# Patient Record
Sex: FEMALE | Race: WHITE | HISPANIC OR LATINO
[De-identification: names, ages, dates, MRNs, and addresses within clinical notes are randomized per-mention and may not be internally consistent; named-entity substitution may affect disease eponyms.]

---

## 2017-09-25 PROBLEM — Z00.00 ENCOUNTER FOR PREVENTIVE HEALTH EXAMINATION: Status: ACTIVE | Noted: 2017-09-25

## 2017-10-19 ENCOUNTER — APPOINTMENT (OUTPATIENT)
Dept: SURGERY | Facility: CLINIC | Age: 42
End: 2017-10-19
Payer: COMMERCIAL

## 2017-10-19 VITALS
DIASTOLIC BLOOD PRESSURE: 98 MMHG | HEART RATE: 86 BPM | HEIGHT: 63 IN | SYSTOLIC BLOOD PRESSURE: 146 MMHG | WEIGHT: 195 LBS | BODY MASS INDEX: 34.55 KG/M2

## 2017-10-19 DIAGNOSIS — Z87.891 PERSONAL HISTORY OF NICOTINE DEPENDENCE: ICD-10-CM

## 2017-10-19 DIAGNOSIS — Z80.9 FAMILY HISTORY OF MALIGNANT NEOPLASM, UNSPECIFIED: ICD-10-CM

## 2017-10-19 DIAGNOSIS — M06.9 RHEUMATOID ARTHRITIS, UNSPECIFIED: ICD-10-CM

## 2017-10-19 PROCEDURE — 31575 DIAGNOSTIC LARYNGOSCOPY: CPT

## 2017-10-19 PROCEDURE — 99204 OFFICE O/P NEW MOD 45 MIN: CPT | Mod: 25

## 2017-10-27 ENCOUNTER — RESULT REVIEW (OUTPATIENT)
Age: 42
End: 2017-10-27

## 2017-12-19 ENCOUNTER — FORM ENCOUNTER (OUTPATIENT)
Age: 42
End: 2017-12-19

## 2017-12-20 ENCOUNTER — OUTPATIENT (OUTPATIENT)
Dept: OUTPATIENT SERVICES | Facility: HOSPITAL | Age: 42
LOS: 1 days | End: 2017-12-20
Payer: COMMERCIAL

## 2017-12-20 VITALS
WEIGHT: 197.09 LBS | HEIGHT: 63.5 IN | HEART RATE: 92 BPM | RESPIRATION RATE: 14 BRPM | DIASTOLIC BLOOD PRESSURE: 80 MMHG | TEMPERATURE: 97 F | SYSTOLIC BLOOD PRESSURE: 128 MMHG

## 2017-12-20 DIAGNOSIS — E04.2 NONTOXIC MULTINODULAR GOITER: ICD-10-CM

## 2017-12-20 LAB
BUN SERPL-MCNC: 10 MG/DL — SIGNIFICANT CHANGE UP (ref 7–23)
CALCIUM SERPL-MCNC: 8.8 MG/DL — SIGNIFICANT CHANGE UP (ref 8.4–10.5)
CHLORIDE SERPL-SCNC: 105 MMOL/L — SIGNIFICANT CHANGE UP (ref 98–107)
CO2 SERPL-SCNC: 25 MMOL/L — SIGNIFICANT CHANGE UP (ref 22–31)
CREAT SERPL-MCNC: 0.68 MG/DL — SIGNIFICANT CHANGE UP (ref 0.5–1.3)
GLUCOSE SERPL-MCNC: 85 MG/DL — SIGNIFICANT CHANGE UP (ref 70–99)
HCT VFR BLD CALC: 39.9 % — SIGNIFICANT CHANGE UP (ref 34.5–45)
HGB BLD-MCNC: 13.3 G/DL — SIGNIFICANT CHANGE UP (ref 11.5–15.5)
MCHC RBC-ENTMCNC: 29.4 PG — SIGNIFICANT CHANGE UP (ref 27–34)
MCHC RBC-ENTMCNC: 33.3 % — SIGNIFICANT CHANGE UP (ref 32–36)
MCV RBC AUTO: 88.1 FL — SIGNIFICANT CHANGE UP (ref 80–100)
NRBC # FLD: 0 — SIGNIFICANT CHANGE UP
PLATELET # BLD AUTO: 322 K/UL — SIGNIFICANT CHANGE UP (ref 150–400)
PMV BLD: 10.7 FL — SIGNIFICANT CHANGE UP (ref 7–13)
POTASSIUM SERPL-MCNC: 4.1 MMOL/L — SIGNIFICANT CHANGE UP (ref 3.5–5.3)
POTASSIUM SERPL-SCNC: 4.1 MMOL/L — SIGNIFICANT CHANGE UP (ref 3.5–5.3)
RBC # BLD: 4.53 M/UL — SIGNIFICANT CHANGE UP (ref 3.8–5.2)
RBC # FLD: 13.3 % — SIGNIFICANT CHANGE UP (ref 10.3–14.5)
SODIUM SERPL-SCNC: 143 MMOL/L — SIGNIFICANT CHANGE UP (ref 135–145)
WBC # BLD: 5.54 K/UL — SIGNIFICANT CHANGE UP (ref 3.8–10.5)
WBC # FLD AUTO: 5.54 K/UL — SIGNIFICANT CHANGE UP (ref 3.8–10.5)

## 2017-12-20 PROCEDURE — 93010 ELECTROCARDIOGRAM REPORT: CPT

## 2017-12-20 PROCEDURE — 71020: CPT | Mod: 26

## 2017-12-20 NOTE — H&P PST ADULT - RS GEN PE MLT RESP DETAILS PC
no intercostal retractions/no rales/breath sounds equal/clear to auscultation bilaterally/good air movement/respirations non-labored/no rhonchi/no chest wall tenderness/no wheezes

## 2017-12-20 NOTE — H&P PST ADULT - NEGATIVE GENERAL GENITOURINARY SYMPTOMS
no flank pain L/no hematuria/no flank pain R/no bladder infections/no dysuria/normal urinary frequency

## 2017-12-20 NOTE — H&P PST ADULT - RESPIRATORY
detailed exam

## 2017-12-20 NOTE — H&P PST ADULT - HISTORY OF PRESENT ILLNESS
43y/o female scheduled for total thyroidectomy on 12/27/2017.  Pt states, "has goiter for the past 3 yrs, over the past year has grown in size, bx benign.  Mother has hx of thyroid cancer."

## 2017-12-20 NOTE — H&P PST ADULT - PROBLEM SELECTOR PLAN 1
Pt scheduled for total thyroidectomy on 12/27/2017.  labs done results pending, ekg with PVC pt scheduled for medical clearance.  Discussed with surgical coordinator Aleja.  Urine cup provided.  Preop teaching done, pt able to verbalize understanding.

## 2017-12-20 NOTE — H&P PST ADULT - NEGATIVE GENERAL SYMPTOMS
no polyuria/no polydipsia/no fever/no malaise/no fatigue/no weight loss/no chills/no sweating/no anorexia/no weight gain/no polyphagia

## 2017-12-20 NOTE — H&P PST ADULT - GASTROINTESTINAL DETAILS
soft/no organomegaly/bowel sounds normal/no bruit/no rebound tenderness/no guarding/no distention/nontender/no rigidity/no masses palpable

## 2017-12-20 NOTE — H&P PST ADULT - NEGATIVE CARDIOVASCULAR SYMPTOMS
no claudication/no orthopnea/no paroxysmal nocturnal dyspnea/no dyspnea on exertion/no palpitations/no chest pain/no peripheral edema

## 2017-12-27 ENCOUNTER — TRANSCRIPTION ENCOUNTER (OUTPATIENT)
Age: 42
End: 2017-12-27

## 2017-12-27 ENCOUNTER — APPOINTMENT (OUTPATIENT)
Dept: SURGERY | Facility: HOSPITAL | Age: 42
End: 2017-12-27

## 2017-12-27 ENCOUNTER — INPATIENT (INPATIENT)
Facility: HOSPITAL | Age: 42
LOS: 0 days | Discharge: HOME | End: 2017-12-28
Attending: SPECIALIST | Admitting: SPECIALIST
Payer: COMMERCIAL

## 2017-12-27 ENCOUNTER — RESULT REVIEW (OUTPATIENT)
Age: 42
End: 2017-12-27

## 2017-12-27 ENCOUNTER — OTHER (OUTPATIENT)
Age: 42
End: 2017-12-27

## 2017-12-27 VITALS
SYSTOLIC BLOOD PRESSURE: 140 MMHG | OXYGEN SATURATION: 98 % | WEIGHT: 197.09 LBS | TEMPERATURE: 98 F | HEIGHT: 63.5 IN | HEART RATE: 80 BPM | RESPIRATION RATE: 16 BRPM | DIASTOLIC BLOOD PRESSURE: 79 MMHG

## 2017-12-27 DIAGNOSIS — E04.2 NONTOXIC MULTINODULAR GOITER: ICD-10-CM

## 2017-12-27 LAB
CALCIUM SERPL-MCNC: 8.5 MG/DL — SIGNIFICANT CHANGE UP (ref 8.4–10.5)
HCG UR QL: NEGATIVE — SIGNIFICANT CHANGE UP

## 2017-12-27 PROCEDURE — 60240 REMOVAL OF THYROID: CPT

## 2017-12-27 PROCEDURE — 60240 REMOVAL OF THYROID: CPT | Mod: AS

## 2017-12-27 PROCEDURE — 88307 TISSUE EXAM BY PATHOLOGIST: CPT | Mod: 26

## 2017-12-27 PROCEDURE — 13132 CMPLX RPR F/C/C/M/N/AX/G/H/F: CPT | Mod: 59

## 2017-12-27 RX ORDER — LEVOTHYROXINE SODIUM 125 MCG
1 TABLET ORAL
Qty: 0 | Refills: 0 | DISCHARGE
Start: 2017-12-27

## 2017-12-27 RX ORDER — LEVOTHYROXINE SODIUM 125 MCG
1 TABLET ORAL
Qty: 30 | Refills: 2 | OUTPATIENT
Start: 2017-12-27 | End: 2018-03-26

## 2017-12-27 RX ORDER — ACETAMINOPHEN 500 MG
650 TABLET ORAL EVERY 6 HOURS
Qty: 0 | Refills: 0 | Status: DISCONTINUED | OUTPATIENT
Start: 2017-12-27 | End: 2017-12-28

## 2017-12-27 RX ORDER — CALCIUM CARBONATE 500(1250)
1 TABLET ORAL
Qty: 0 | Refills: 0 | Status: DISCONTINUED | OUTPATIENT
Start: 2017-12-27 | End: 2017-12-28

## 2017-12-27 RX ORDER — OMEGA-3 ACID ETHYL ESTERS 1 G
1 CAPSULE ORAL
Qty: 0 | Refills: 0 | COMMUNITY

## 2017-12-27 RX ORDER — CALCIUM GLUCONATE 100 MG/ML
1 VIAL (ML) INTRAVENOUS ONCE
Qty: 0 | Refills: 0 | Status: COMPLETED | OUTPATIENT
Start: 2017-12-27 | End: 2017-12-27

## 2017-12-27 RX ORDER — OXYCODONE AND ACETAMINOPHEN 5; 325 MG/1; MG/1
1 TABLET ORAL EVERY 4 HOURS
Qty: 0 | Refills: 0 | Status: DISCONTINUED | OUTPATIENT
Start: 2017-12-27 | End: 2017-12-28

## 2017-12-27 RX ORDER — ACETAMINOPHEN 500 MG
2 TABLET ORAL
Qty: 0 | Refills: 0 | DISCHARGE
Start: 2017-12-27

## 2017-12-27 RX ORDER — CALCIUM CARBONATE 500(1250)
1 TABLET ORAL
Qty: 0 | Refills: 0 | COMMUNITY
Start: 2017-12-27

## 2017-12-27 RX ORDER — SODIUM CHLORIDE 9 MG/ML
1000 INJECTION, SOLUTION INTRAVENOUS
Qty: 0 | Refills: 0 | Status: DISCONTINUED | OUTPATIENT
Start: 2017-12-27 | End: 2017-12-28

## 2017-12-27 RX ORDER — IBUPROFEN 200 MG
2 TABLET ORAL
Qty: 0 | Refills: 0 | COMMUNITY

## 2017-12-27 RX ORDER — LEVOTHYROXINE SODIUM 125 MCG
100 TABLET ORAL DAILY
Qty: 0 | Refills: 0 | Status: DISCONTINUED | OUTPATIENT
Start: 2017-12-27 | End: 2017-12-28

## 2017-12-27 RX ADMIN — SODIUM CHLORIDE 50 MILLILITER(S): 9 INJECTION, SOLUTION INTRAVENOUS at 18:50

## 2017-12-27 RX ADMIN — Medication 650 MILLIGRAM(S): at 21:21

## 2017-12-27 RX ADMIN — Medication 650 MILLIGRAM(S): at 22:20

## 2017-12-27 RX ADMIN — Medication 1 TABLET(S): at 23:37

## 2017-12-27 RX ADMIN — Medication 1 TABLET(S): at 19:13

## 2017-12-27 RX ADMIN — Medication 200 GRAM(S): at 18:50

## 2017-12-27 NOTE — DISCHARGE NOTE ADULT - INSTRUCTIONS
Regular diet Please NOTIFY MD for any of the following s/s: S/S infection (Fever >100.4, chills, increased redness, increased bleeding, pus-like drainage from incision line), uncontrolled pain not relieved by pain medications, persistent nausea/vomiting or inability to tolerate diet. No heavy lifting; No driving if/while taking narcotic pain medications. Please drink 6-8 glasses of water daily to stay hydrated.

## 2017-12-27 NOTE — DISCHARGE NOTE ADULT - CONDITIONS AT DISCHARGE
Pt A&Ox4. Vs stable. Pain well controlled. Anterior neck surgical incision clean and dry and free from s/s infection with steri-strips LUPE. Pt OOB, ambulating in room and on unit, tolerating PO diet, and voiding adequately.

## 2017-12-27 NOTE — DISCHARGE NOTE ADULT - MEDICATION SUMMARY - MEDICATIONS TO TAKE
I will START or STAY ON the medications listed below when I get home from the hospital:    acetaminophen 325 mg oral tablet  -- 2 tab(s) by mouth every 6 hours, As needed, Moderate Pain (4 - 6)  -- Indication: For Non-toxic multinodular goiter    calcium carbonate 500 mg (200 mg elemental calcium) oral tablet, chewable  -- 1 tab(s) by mouth 4 times a day  -- Indication: For Non-toxic multinodular goiter    levothyroxine 100 mcg (0.1 mg) oral tablet  -- 1 tab(s) by mouth once a day  -- Indication: For Non-toxic multinodular goiter I will START or STAY ON the medications listed below when I get home from the hospital:    acetaminophen 325 mg oral tablet  -- 2 tab(s) by mouth every 6 hours, As needed, Moderate Pain (4 - 6)  -- Indication: For Non-toxic multinodular goiter    calcium carbonate 500 mg (200 mg elemental calcium) oral tablet, chewable  -- 1 tab(s) by mouth 4 times a day  -- Indication: For Non-toxic multinodular goiter    levothyroxine 100 mcg (0.1 mg) oral tablet  -- 1 tab(s) by mouth once a day  -- Indication: For Non-toxic multinodular goiter    Synthroid 100 mcg (0.1 mg) oral tablet  -- 1 tab(s) by mouth once a day   -- It is very important that you take or use this exactly as directed.  Do not skip doses or discontinue unless directed by your doctor.  Medication should be taken with plenty of water.  Some non-prescription drugs may aggravate your condition.  Read all labels carefully.  If a warning appears, check with your doctor before taking.  Take medication on an empty stomach 1 hour before or 2 to 3 hours after a meal unless otherwise directed by your doctor.    -- Indication: For Multinodular goiter (nontoxic) I will START or STAY ON the medications listed below when I get home from the hospital:    acetaminophen 325 mg oral tablet  -- 2 tab(s) by mouth every 6 hours, As needed, Moderate Pain (4 - 6)  -- Indication: For Non-toxic multinodular goiter    calcium carbonate 500 mg (200 mg elemental calcium) oral tablet, chewable  -- 2 tab(s) by mouth every 6 hours  -- Indication: For Calcium     levothyroxine 100 mcg (0.1 mg) oral tablet  -- 1 tab(s) by mouth once a day  -- Indication: For Non-toxic multinodular goiter

## 2017-12-27 NOTE — DISCHARGE NOTE ADULT - PLAN OF CARE
Please allow steri-strips to fall off on their own.  Ok to shower and rinse wound with warm soapy water tomorrow.  Do not scrub wound, pat dry. Please call the doctor immediately if you develop fever, chills, inability to tolerate liquid or food, difficulty swallowing or speaking, numbness/ tingling and/or fatigue/ lethargy. Do not drive or operate machinery while taking narcotic pain medication such as percocet or oxycodone. Please follow up with your surgeon -  and your primary care doctor within 1 week of discharge. Pain medication

## 2017-12-27 NOTE — DISCHARGE NOTE ADULT - CARE PLAN
Principal Discharge DX:	Multinodular goiter (nontoxic) Principal Discharge DX:	Multinodular goiter (nontoxic)  Goal:	Pain medication  Instructions for follow-up, activity and diet:	Please allow steri-strips to fall off on their own.  Ok to shower and rinse wound with warm soapy water tomorrow.  Do not scrub wound, pat dry. Please call the doctor immediately if you develop fever, chills, inability to tolerate liquid or food, difficulty swallowing or speaking, numbness/ tingling and/or fatigue/ lethargy. Do not drive or operate machinery while taking narcotic pain medication such as percocet or oxycodone. Please follow up with your surgeon -  and your primary care doctor within 1 week of discharge.

## 2017-12-27 NOTE — BRIEF OPERATIVE NOTE - PROCEDURE
<<-----Click on this checkbox to enter Procedure Total thyroidectomy  12/27/2017    Active  KTORTORELL

## 2017-12-27 NOTE — PROGRESS NOTE ADULT - SUBJECTIVE AND OBJECTIVE BOX
[facility] GENERAL SURGERY POST-OP NOTE    SUBJECTIVE: Pt underwent total thyroidectomy. Pt tolerated procedure well and transferred to PACU then floor. Pt seen and evaluated at bedside. Resting comfortably in bed. Pain controlled. Denies nausea/vomiting, CP, palpitations, SOB, lightheaded, dizziness. Voiding.     Objective:  Gen: NAD, AAOx3  HEENT: neck flat without evidence of underlying hematoma, incision c/d/i, TEVIN with minimal sanguinous output  Pulm: b/l chest rise. No work on breathing  Card: RRR  Abd: soft, NT, ND      Vital Signs Last 24 Hrs  T(C): 36.6 (27 Dec 2017 21:07), Max: 36.7 (27 Dec 2017 13:51)  T(F): 97.8 (27 Dec 2017 21:07), Max: 98.1 (27 Dec 2017 13:51)  HR: 78 (27 Dec 2017 21:07) (78 - 96)  BP: 130/80 (27 Dec 2017 21:07) (106/65 - 140/79)  BP(mean): --  RR: 17 (27 Dec 2017 21:07) (15 - 21)  SpO2: 96% (27 Dec 2017 21:07) (95% - 99%)  I&O's Summary    27 Dec 2017 07:01  -  27 Dec 2017 23:13  --------------------------------------------------------  IN: 270 mL / OUT: 400 mL / NET: -130 mL      I&O's Detail    27 Dec 2017 07:01  -  27 Dec 2017 23:13  --------------------------------------------------------  IN:    lactated ringers.: 150 mL    Oral Fluid: 120 mL  Total IN: 270 mL    OUT:    Voided: 400 mL  Total OUT: 400 mL    Total NET: -130 mL          MEDICATIONS  (STANDING):  calcium carbonate 500 mG (Tums) Chewable 1 Tablet(s) Chew four times a day  lactated ringers. 1000 milliLiter(s) (50 mL/Hr) IV Continuous <Continuous>  levothyroxine 100 MICROGram(s) Oral daily    MEDICATIONS  (PRN):  acetaminophen   Tablet. 650 milliGRAM(s) Oral every 6 hours PRN Moderate Pain (4 - 6)  oxyCODONE    5 mG/acetaminophen 325 mG 1 Tablet(s) Oral every 4 hours PRN Severe Pain (7 - 10)      LABS:      Ca    8.5      27 Dec 2017 20:06            RADIOLOGY & ADDITIONAL STUDIES:      A/P: 42y Female s/p total thyroidectomy. Pt hemodynamically stable.      - Pain control  - Strict I/O's  - F/u any evidence of neck swelling, voice hoarseness, or dyspnea  - OOB/DVT ppx  - AM labs  - f/u TEVIN output

## 2017-12-27 NOTE — DISCHARGE NOTE ADULT - MEDICATION SUMMARY - MEDICATIONS TO STOP TAKING
I will STOP taking the medications listed below when I get home from the hospital:    Fish Oil oral capsule  -- 1 cap(s) by mouth once a day in am. Last dose 11/30/17    Multiple Vitamins oral tablet  -- 1 tab(s) by mouth once a day in am. Last dose 11/30/17    Advil 200 mg oral tablet  -- 2 tab(s) by mouth , As Needed. Last dose 11/30/17

## 2017-12-27 NOTE — DISCHARGE NOTE ADULT - CARE PROVIDER_API CALL
Yash Blue), Plastic Surgery; Surgery  [address] [place] [address]  Phone: [phone]  Fax: [phone]

## 2017-12-27 NOTE — DISCHARGE NOTE ADULT - PATIENT PORTAL LINK FT
“You can access the Follow[employer] Patient Portal, offered by [facility], by registering with the following website: [email or web address]”

## 2017-12-28 VITALS — HEART RATE: 92 BPM

## 2017-12-28 LAB — CALCIUM SERPL-MCNC: 8.2 MG/DL — LOW (ref 8.4–10.5)

## 2017-12-28 RX ORDER — CALCIUM CARBONATE 500(1250)
2 TABLET ORAL EVERY 6 HOURS
Qty: 0 | Refills: 0 | Status: DISCONTINUED | OUTPATIENT
Start: 2017-12-28 | End: 2017-12-28

## 2017-12-28 RX ORDER — CALCIUM CARBONATE 500(1250)
2 TABLET ORAL
Qty: 0 | Refills: 0 | DISCHARGE
Start: 2017-12-28

## 2017-12-28 RX ADMIN — Medication 100 MICROGRAM(S): at 05:05

## 2017-12-28 RX ADMIN — Medication 650 MILLIGRAM(S): at 08:57

## 2017-12-28 RX ADMIN — Medication 1 TABLET(S): at 05:05

## 2017-12-28 RX ADMIN — Medication 650 MILLIGRAM(S): at 08:27

## 2017-12-28 RX ADMIN — Medication 2 TABLET(S): at 11:13

## 2017-12-28 NOTE — PROGRESS NOTE ADULT - ASSESSMENT
42y Female s/p total thyroidectomy. Pt hemodynamically stable.      - Pain control  - Strict I/O's  - F/u any evidence of neck swelling, voice hoarseness, or dyspnea  - OOB/DVT ppx  - AM labs  - f/u TEVIN output  - possible d/c today  - d/c TEVIN drain

## 2017-12-28 NOTE — PROGRESS NOTE ADULT - SUBJECTIVE AND OBJECTIVE BOX
GENERAL SURGERY DAILY PROGRESS NOTE:       Subjective:  NAOE. Pt seen at bedside this am.         Objective:    PE:  Gen: NAD, AAOx3  HEENT: neck flat without evidence of underlying hematoma, incision c/d/i, TEVIN with minimal sanguinous output  Pulm: b/l chest rise. No work on breathing  Card: RRR  Abd: soft, NT, ND    Vital Signs Last 24 Hrs  T(C): 36.6 (27 Dec 2017 21:07), Max: 36.7 (27 Dec 2017 13:51)  T(F): 97.8 (27 Dec 2017 21:07), Max: 98.1 (27 Dec 2017 13:51)  HR: 78 (27 Dec 2017 21:07) (78 - 96)  BP: 130/80 (27 Dec 2017 21:07) (106/65 - 140/79)  BP(mean): --  RR: 17 (27 Dec 2017 21:07) (15 - 21)  SpO2: 96% (27 Dec 2017 21:07) (95% - 99%)    I&O's Detail    27 Dec 2017 07:01  -  28 Dec 2017 00:13  --------------------------------------------------------  IN:    lactated ringers.: 150 mL    Oral Fluid: 120 mL  Total IN: 270 mL    OUT:    Voided: 400 mL  Total OUT: 400 mL    Total NET: -130 mL          Daily Height in cm: 160.02 (27 Dec 2017 21:07)    Daily     MEDICATIONS  (STANDING):  calcium carbonate 500 mG (Tums) Chewable 1 Tablet(s) Chew four times a day  lactated ringers. 1000 milliLiter(s) (50 mL/Hr) IV Continuous <Continuous>  levothyroxine 100 MICROGram(s) Oral daily    MEDICATIONS  (PRN):  acetaminophen   Tablet. 650 milliGRAM(s) Oral every 6 hours PRN Moderate Pain (4 - 6)  oxyCODONE    5 mG/acetaminophen 325 mG 1 Tablet(s) Oral every 4 hours PRN Severe Pain (7 - 10)      LABS:      Ca    8.5      27 Dec 2017 20:06            RADIOLOGY & ADDITIONAL STUDIES:

## 2017-12-28 NOTE — PROGRESS NOTE ADULT - SUBJECTIVE AND OBJECTIVE BOX
ANESTHESIA POSTOP CHECK    42y Female POSTOP DAY 1 S/P     Vital Signs Last 24 Hrs  T(C): 36.8 (28 Dec 2017 09:29), Max: 37.2 (28 Dec 2017 02:16)  T(F): 98.3 (28 Dec 2017 09:29), Max: 99 (28 Dec 2017 02:16)  HR: 92 (28 Dec 2017 09:58) (78 - 107)  BP: 125/75 (28 Dec 2017 09:29) (106/65 - 140/79)  BP(mean): --  RR: 18 (28 Dec 2017 09:29) (15 - 21)  SpO2: 100% (28 Dec 2017 09:29) (95% - 100%)  I&O's Summary    27 Dec 2017 07:01  -  28 Dec 2017 07:00  --------------------------------------------------------  IN: 670 mL / OUT: 840 mL / NET: -170 mL    28 Dec 2017 07:01  -  28 Dec 2017 10:09  --------------------------------------------------------  IN: 0 mL / OUT: 400 mL / NET: -400 mL        [X ] NO APPARENT ANESTHESIA COMPLICATIONS      Comments: Seen at 0918 am.  Sitting up.  Smiling.  Coffee on  table.  Home today.  No issues.

## 2017-12-28 NOTE — PROGRESS NOTE ADULT - SUBJECTIVE AND OBJECTIVE BOX
1 day s/p total thyroidectomy. no collections.  afebrile.  starting to increase po intake. calcium low normal/stable. negative chvostek's sign. TEVIN removed. discharge home . to increase calcium intake to 2 tabs every 6 hours.  f/u in office.

## 2018-01-02 LAB — SURGICAL PATHOLOGY STUDY: SIGNIFICANT CHANGE UP

## 2018-01-09 ENCOUNTER — APPOINTMENT (OUTPATIENT)
Dept: SURGERY | Facility: CLINIC | Age: 43
End: 2018-01-09
Payer: COMMERCIAL

## 2018-01-09 PROCEDURE — 99024 POSTOP FOLLOW-UP VISIT: CPT

## 2018-01-09 RX ORDER — LEVOTHYROXINE SODIUM 100 UG/1
100 TABLET ORAL
Refills: 0 | Status: ACTIVE | COMMUNITY

## 2018-02-20 ENCOUNTER — APPOINTMENT (OUTPATIENT)
Dept: SURGERY | Facility: CLINIC | Age: 43
End: 2018-02-20
Payer: COMMERCIAL

## 2018-02-20 PROCEDURE — 99024 POSTOP FOLLOW-UP VISIT: CPT

## 2018-03-22 ENCOUNTER — RX RENEWAL (OUTPATIENT)
Age: 43
End: 2018-03-22

## 2018-06-19 ENCOUNTER — APPOINTMENT (OUTPATIENT)
Dept: SURGERY | Facility: CLINIC | Age: 43
End: 2018-06-19

## 2018-10-18 ENCOUNTER — APPOINTMENT (OUTPATIENT)
Dept: SURGERY | Facility: CLINIC | Age: 43
End: 2018-10-18
Payer: MEDICAID

## 2018-10-18 DIAGNOSIS — E04.2 NONTOXIC MULTINODULAR GOITER: ICD-10-CM

## 2018-10-18 PROCEDURE — 99213 OFFICE O/P EST LOW 20 MIN: CPT

## 2020-12-09 NOTE — PATIENT PROFILE ADULT. - BILL OF RIGHTS/ADMISSION INFORMATION PROVIDED TO:
----- Message from Sabina Garcia RN sent at 12/8/2020 11:49 AM CST -----  Miladis. I am Lois RN for Dr Leeanne Ye. We are seeing this patient today for follow up of tremors. I just wanted to make you aware of a concerning conversation I had with patient upon check in. Pt states she is in a sever depression state, she states she does not mind leaving this world or taking something to end her depression. She did give me permission to mention this information to you. She does state that she does take her two antidepressants every day but does not feel anything is working. I was just concerned and thought I would notify you. Again patient is aware I was going to inform you.   Sabina Garcia RN / Dr Leopoldo Lis Patient

## 2022-06-21 ENCOUNTER — INPATIENT (INPATIENT)
Facility: HOSPITAL | Age: 47
LOS: 1 days | Discharge: LEFT BEFORE DISCHARGE | DRG: 446 | End: 2022-06-23
Attending: STUDENT IN AN ORGANIZED HEALTH CARE EDUCATION/TRAINING PROGRAM | Admitting: HOSPITALIST
Payer: COMMERCIAL

## 2022-06-21 VITALS
HEART RATE: 87 BPM | WEIGHT: 190.04 LBS | RESPIRATION RATE: 16 BRPM | HEIGHT: 63 IN | TEMPERATURE: 99 F | OXYGEN SATURATION: 97 % | SYSTOLIC BLOOD PRESSURE: 147 MMHG | DIASTOLIC BLOOD PRESSURE: 93 MMHG

## 2022-06-21 DIAGNOSIS — K80.50 CALCULUS OF BILE DUCT WITHOUT CHOLANGITIS OR CHOLECYSTITIS WITHOUT OBSTRUCTION: ICD-10-CM

## 2022-06-21 PROBLEM — E04.2 NONTOXIC MULTINODULAR GOITER: Chronic | Status: ACTIVE | Noted: 2017-12-20

## 2022-06-21 LAB
ALBUMIN SERPL ELPH-MCNC: 3.5 G/DL — SIGNIFICANT CHANGE UP (ref 3.3–5.2)
ALP SERPL-CCNC: 351 U/L — HIGH (ref 40–120)
ALT FLD-CCNC: 234 U/L — HIGH
ANION GAP SERPL CALC-SCNC: 17 MMOL/L — SIGNIFICANT CHANGE UP (ref 5–17)
APPEARANCE UR: CLEAR — SIGNIFICANT CHANGE UP
AST SERPL-CCNC: 132 U/L — HIGH
BACTERIA # UR AUTO: ABNORMAL
BASE EXCESS BLDV CALC-SCNC: -1.1 MMOL/L — SIGNIFICANT CHANGE UP (ref -2–3)
BASOPHILS # BLD AUTO: 0.03 K/UL — SIGNIFICANT CHANGE UP (ref 0–0.2)
BASOPHILS NFR BLD AUTO: 0.5 % — SIGNIFICANT CHANGE UP (ref 0–2)
BILIRUB DIRECT SERPL-MCNC: 7.8 MG/DL — HIGH (ref 0–0.3)
BILIRUB INDIRECT FLD-MCNC: SIGNIFICANT CHANGE UP MG/DL (ref 0.2–1)
BILIRUB SERPL-MCNC: 11.1 MG/DL — HIGH (ref 0.4–2)
BILIRUB UR-MCNC: ABNORMAL
BUN SERPL-MCNC: 4.8 MG/DL — LOW (ref 8–20)
CA-I SERPL-SCNC: 0.99 MMOL/L — LOW (ref 1.15–1.33)
CALCIUM SERPL-MCNC: 9 MG/DL — SIGNIFICANT CHANGE UP (ref 8.6–10.2)
CHLORIDE BLDV-SCNC: 103 MMOL/L — SIGNIFICANT CHANGE UP (ref 98–107)
CHLORIDE SERPL-SCNC: 99 MMOL/L — SIGNIFICANT CHANGE UP (ref 98–107)
CO2 SERPL-SCNC: 21 MMOL/L — LOW (ref 22–29)
COLOR SPEC: YELLOW — SIGNIFICANT CHANGE UP
CREAT SERPL-MCNC: <0.2 MG/DL — LOW (ref 0.5–1.3)
DIFF PNL FLD: ABNORMAL
EGFR: 148 ML/MIN/1.73M2 — SIGNIFICANT CHANGE UP
EOSINOPHIL # BLD AUTO: 0.25 K/UL — SIGNIFICANT CHANGE UP (ref 0–0.5)
EOSINOPHIL NFR BLD AUTO: 3.9 % — SIGNIFICANT CHANGE UP (ref 0–6)
EPI CELLS # UR: SIGNIFICANT CHANGE UP
GAS PNL BLDV: 135 MMOL/L — LOW (ref 136–145)
GAS PNL BLDV: SIGNIFICANT CHANGE UP
GLUCOSE BLDV-MCNC: 86 MG/DL — SIGNIFICANT CHANGE UP (ref 70–99)
GLUCOSE SERPL-MCNC: 87 MG/DL — SIGNIFICANT CHANGE UP (ref 70–99)
GLUCOSE UR QL: NEGATIVE MG/DL — SIGNIFICANT CHANGE UP
HCG SERPL-ACNC: <4 MIU/ML — SIGNIFICANT CHANGE UP
HCO3 BLDV-SCNC: 24 MMOL/L — SIGNIFICANT CHANGE UP (ref 22–29)
HCT VFR BLD CALC: 37.1 % — SIGNIFICANT CHANGE UP (ref 34.5–45)
HCT VFR BLDA CALC: 39 % — SIGNIFICANT CHANGE UP
HGB BLD CALC-MCNC: 12.9 G/DL — SIGNIFICANT CHANGE UP (ref 11.7–16.1)
HGB BLD-MCNC: 12.4 G/DL — SIGNIFICANT CHANGE UP (ref 11.5–15.5)
HIV 1 & 2 AB SERPL IA.RAPID: SIGNIFICANT CHANGE UP
IMM GRANULOCYTES NFR BLD AUTO: 0.2 % — SIGNIFICANT CHANGE UP (ref 0–1.5)
KETONES UR-MCNC: ABNORMAL
LACTATE BLDV-MCNC: 1 MMOL/L — SIGNIFICANT CHANGE UP (ref 0.5–2)
LEUKOCYTE ESTERASE UR-ACNC: ABNORMAL
LIDOCAIN IGE QN: 21 U/L — LOW (ref 22–51)
LYMPHOCYTES # BLD AUTO: 2.41 K/UL — SIGNIFICANT CHANGE UP (ref 1–3.3)
LYMPHOCYTES # BLD AUTO: 37.7 % — SIGNIFICANT CHANGE UP (ref 13–44)
MCHC RBC-ENTMCNC: 30.1 PG — SIGNIFICANT CHANGE UP (ref 27–34)
MCHC RBC-ENTMCNC: 33.4 GM/DL — SIGNIFICANT CHANGE UP (ref 32–36)
MCV RBC AUTO: 90 FL — SIGNIFICANT CHANGE UP (ref 80–100)
MONOCYTES # BLD AUTO: 0.84 K/UL — SIGNIFICANT CHANGE UP (ref 0–0.9)
MONOCYTES NFR BLD AUTO: 13.1 % — SIGNIFICANT CHANGE UP (ref 2–14)
NEUTROPHILS # BLD AUTO: 2.85 K/UL — SIGNIFICANT CHANGE UP (ref 1.8–7.4)
NEUTROPHILS NFR BLD AUTO: 44.6 % — SIGNIFICANT CHANGE UP (ref 43–77)
NITRITE UR-MCNC: NEGATIVE — SIGNIFICANT CHANGE UP
PCO2 BLDV: 37 MMHG — LOW (ref 39–42)
PH BLDV: 7.41 — SIGNIFICANT CHANGE UP (ref 7.32–7.43)
PH UR: 6 — SIGNIFICANT CHANGE UP (ref 5–8)
PLATELET # BLD AUTO: 315 K/UL — SIGNIFICANT CHANGE UP (ref 150–400)
PO2 BLDV: 185 MMHG — HIGH (ref 25–45)
POTASSIUM BLDV-SCNC: 3.5 MMOL/L — SIGNIFICANT CHANGE UP (ref 3.5–5.1)
POTASSIUM SERPL-MCNC: 3.8 MMOL/L — SIGNIFICANT CHANGE UP (ref 3.5–5.3)
POTASSIUM SERPL-SCNC: 3.8 MMOL/L — SIGNIFICANT CHANGE UP (ref 3.5–5.3)
PROT SERPL-MCNC: 7.8 G/DL — SIGNIFICANT CHANGE UP (ref 6.6–8.7)
PROT UR-MCNC: NEGATIVE — SIGNIFICANT CHANGE UP
RBC # BLD: 4.12 M/UL — SIGNIFICANT CHANGE UP (ref 3.8–5.2)
RBC # FLD: 15.4 % — HIGH (ref 10.3–14.5)
RBC CASTS # UR COMP ASSIST: SIGNIFICANT CHANGE UP /HPF (ref 0–4)
SAO2 % BLDV: 99.8 % — SIGNIFICANT CHANGE UP
SARS-COV-2 RNA SPEC QL NAA+PROBE: SIGNIFICANT CHANGE UP
SODIUM SERPL-SCNC: 136 MMOL/L — SIGNIFICANT CHANGE UP (ref 135–145)
SP GR SPEC: 1.01 — SIGNIFICANT CHANGE UP (ref 1.01–1.02)
UROBILINOGEN FLD QL: 4 MG/DL
WBC # BLD: 6.39 K/UL — SIGNIFICANT CHANGE UP (ref 3.8–10.5)
WBC # FLD AUTO: 6.39 K/UL — SIGNIFICANT CHANGE UP (ref 3.8–10.5)
WBC UR QL: SIGNIFICANT CHANGE UP /HPF (ref 0–5)

## 2022-06-21 PROCEDURE — 93010 ELECTROCARDIOGRAM REPORT: CPT

## 2022-06-21 PROCEDURE — 74177 CT ABD & PELVIS W/CONTRAST: CPT | Mod: 26,MA

## 2022-06-21 PROCEDURE — 71045 X-RAY EXAM CHEST 1 VIEW: CPT | Mod: 26

## 2022-06-21 PROCEDURE — 99223 1ST HOSP IP/OBS HIGH 75: CPT

## 2022-06-21 PROCEDURE — 76705 ECHO EXAM OF ABDOMEN: CPT | Mod: 26

## 2022-06-21 PROCEDURE — 99285 EMERGENCY DEPT VISIT HI MDM: CPT

## 2022-06-21 RX ORDER — ONDANSETRON 8 MG/1
4 TABLET, FILM COATED ORAL EVERY 8 HOURS
Refills: 0 | Status: DISCONTINUED | OUTPATIENT
Start: 2022-06-21 | End: 2022-06-23

## 2022-06-21 RX ORDER — LANOLIN ALCOHOL/MO/W.PET/CERES
3 CREAM (GRAM) TOPICAL AT BEDTIME
Refills: 0 | Status: DISCONTINUED | OUTPATIENT
Start: 2022-06-21 | End: 2022-06-23

## 2022-06-21 RX ORDER — SODIUM CHLORIDE 9 MG/ML
1000 INJECTION INTRAMUSCULAR; INTRAVENOUS; SUBCUTANEOUS
Refills: 0 | Status: DISCONTINUED | OUTPATIENT
Start: 2022-06-21 | End: 2022-06-22

## 2022-06-21 RX ORDER — SODIUM CHLORIDE 9 MG/ML
1000 INJECTION, SOLUTION INTRAVENOUS ONCE
Refills: 0 | Status: COMPLETED | OUTPATIENT
Start: 2022-06-21 | End: 2022-06-21

## 2022-06-21 RX ORDER — ACETAMINOPHEN 500 MG
1000 TABLET ORAL ONCE
Refills: 0 | Status: COMPLETED | OUTPATIENT
Start: 2022-06-21 | End: 2022-06-21

## 2022-06-21 RX ADMIN — SODIUM CHLORIDE 125 MILLILITER(S): 9 INJECTION INTRAMUSCULAR; INTRAVENOUS; SUBCUTANEOUS at 21:53

## 2022-06-21 RX ADMIN — Medication 400 MILLIGRAM(S): at 17:37

## 2022-06-21 RX ADMIN — Medication 1000 MILLIGRAM(S): at 18:18

## 2022-06-21 RX ADMIN — SODIUM CHLORIDE 1000 MILLILITER(S): 9 INJECTION, SOLUTION INTRAVENOUS at 17:37

## 2022-06-21 NOTE — H&P ADULT - NSHPPHYSICALEXAM_GEN_ALL_CORE
T(C): 36.8 (06-21-22 @ 22:08), Max: 37 (06-21-22 @ 15:47)  HR: 77 (06-21-22 @ 22:08) (77 - 87)  BP: 150/96 (06-21-22 @ 22:08) (147/93 - 150/96)  RR: 18 (06-21-22 @ 22:08) (16 - 18)  SpO2: 98% (06-21-22 @ 22:08) (97% - 98%)    GENERAL: patient appears well, no acute distress, appropriate, pleasant  EYES: sclera clear, no exudates  ENMT: oropharynx clear without erythema, no exudates, moist mucous membranes  NECK: supple, soft, no thyromegaly noted  LUNGS: good air entry bilaterally, clear to auscultation, symmetric breath sounds, no wheezing or rhonchi appreciated  HEART: soft S1/S2, regular rate and rhythm, no murmurs noted, no lower extremity edema  GASTROINTESTINAL: abdomen is soft, nontender, nondistended, normoactive bowel sounds, no palpable masses  INTEGUMENT: good skin turgor, warm skin, appears well perfused  MUSCULOSKELETAL: no clubbing or cyanosis, no obvious deformity  NEUROLOGIC: awake, alert, oriented x3, good muscle tone in 4 extremities, no obvious sensory deficits  PSYCHIATRIC: mood is good, affect is congruent, linear and logical thought process  HEME/LYMPH: no palpable supraclavicular nodules, no obvious ecchymosis or petechiae T(C): 36.8 (06-21-22 @ 22:08), Max: 37 (06-21-22 @ 15:47)  HR: 77 (06-21-22 @ 22:08) (77 - 87)  BP: 150/96 (06-21-22 @ 22:08) (147/93 - 150/96)  RR: 18 (06-21-22 @ 22:08) (16 - 18)  SpO2: 98% (06-21-22 @ 22:08) (97% - 98%)    GENERAL: patient appears well, no acute distress, appropriate, pleasant  EYES: sclera icterus   ENMT: oropharynx clear without erythema, no exudates, moist mucous membranes  NECK: supple, soft, no thyromegaly noted  LUNGS: good air entry bilaterally, clear to auscultation, symmetric breath sounds, no wheezing or rhonchi appreciated  HEART: soft S1/S2, regular rate and rhythm, no murmurs noted, no lower extremity edema  GASTROINTESTINAL: abdomen is soft, nontender, nondistended, normoactive bowel sounds, no palpable masses  INTEGUMENT: good skin turgor, warm skin, appears well perfused  MUSCULOSKELETAL: no clubbing or cyanosis, no obvious deformity  NEUROLOGIC: awake, alert, oriented x3, good muscle tone in 4 extremities, no obvious sensory deficits  PSYCHIATRIC: mood is good, affect is congruent, linear and logical thought process  HEME/LYMPH: no palpable supraclavicular nodules, no obvious ecchymosis or petechiae

## 2022-06-21 NOTE — H&P ADULT - ASSESSMENT
45yo M with PMHx RA on Humira, thyroidectomy presented to ED c/o abdominal pain. Pt reports to intermittent epigastric/RUQ pain the past 1month.     choledocholithiasis/cholelithiases  -US gallbladder as noted above   -elevated LFTs, tbili 11.1,GTA092, , alk phos 351   -no clinical cholangitis or cholecystis, afebrile, no leukocytosis  -Morphine PRN, IVF hydration   -NPO, zofran for nausea   -GI consulted and will eventually need surgical eval    Hypothyroid  -cont synthroid    DVT ppx  -ambulate as tolerated              45yo M with PMHx RA on Humira, thyroidectomy presented to ED c/o abdominal pain. Pt reports to intermittent epigastric/RUQ pain the past 1month.     choledocholithiasis/cholelithiases  -US gallbladder as noted above   -elevated LFTs, tbili 11.1,VEU269, , alk phos 351   -sclera icterus   -no clinical cholangitis or cholecystis, afebrile, no leukocytosis  -Morphine PRN, IVF hydration   -NPO, zofran for nausea   -GI consulted and will eventually need surgical eval    Hypothyroid  -cont synthroid    DVT ppx  -ambulate as tolerated

## 2022-06-21 NOTE — ED STATDOCS - PHYSICAL EXAMINATION
General: well-appearing, no acute distress  Head: normocephalic, atraumatic  Eyes: PERRL, (+) scleral icterus   Mouth: moist mucous membranes  Neck: supple neck, no lymphadenopathy  CV: normal rate and rhythm, no LE edema, peripheral pulses 2+ bilateral UE and LE  Respiratory: clear to auscultation bilaterally  Abdomen: soft, nondistended, (+) RUQ TTP, (+) [place]'s Sign, (+) RLQ TTP  : no suprapubic tenderness, no CVAT  MSK: no joint deformities  Neuro: alert and oriented x3, speech clear, moving all extremities spontaneously without difficulty  Skin: no rash noted General: well-appearing woman in no acute distress  Head: normocephalic, atraumatic  Eyes: PERRL, (+) scleral icterus   Mouth: moist mucous membranes  Neck: supple neck  CV: normal rate and rhythm, no LE edema, peripheral pulses 2+ bilateral UE and LE  Respiratory: clear to auscultation bilaterally  Abdomen: soft, nondistended, (+) RUQ TTP, (+) [place]'s Sign, (+) RLQ TTP  : no suprapubic tenderness, no CVAT  MSK: no joint deformities  Neuro: alert and oriented x3, speech clear, moving all extremities spontaneously without difficulty  Skin: no rash noted

## 2022-06-21 NOTE — ED STATDOCS - PROGRESS NOTE DETAILS
Lissette Cohn PA :  PT evaluated by intake physician. HPI/PE/ROS as noted above. Will follow up plan per intake physician  labs with bili 11.1, US GB multiple stones no cholecystitis  +CBD dilatation   spoke with hospitalist Dr. Melgoza for admission

## 2022-06-21 NOTE — ED STATDOCS - OBJECTIVE STATEMENT
46 YOF w/ PMHx. of RA on Humira s/p thyroidectomy on Synthroid and Levothyroxine presents to ED c/o recent abdominal pain w/ episodes of vomiting over Memorial Day weekend now w/ frequent intermittent episodes of heartburn and abdominal discomfort. PT reports [facility] visit w/ ultrasound significant for fatty liver, cholelithiasis, and common bile duct measuring 9mm. PT reports increased lower abdominal pain today prompting ED visit. Endorses intermittent dysuria but denies fever, chills, cough, congestion, chest pain, SOB, hematuria. LMP: 05/23/22. 46 YOF w/ PMHx. of RA on Humira s/p thyroidectomy on Synthroid presents to ED c/o recent abdominal pain w/ episodes of vomiting over Memorial Day weekend now w/ frequent intermittent episodes of "heartburn" and abdominal discomfort. PT reports [facility] w/ ultrasound significant for fatty liver, cholelithiasis, and common bile duct measuring 9mm. PT reports increased lower abdominal pain today prompting ED visit. Endorses intermittent dysuria but denies fever, chills, diarrhea, blood in stool, cough, chest pain, SOB, hematuria, cough/congestion. LMP: 05/23/22.

## 2022-06-21 NOTE — ED ADULT NURSE REASSESSMENT NOTE - NS ED NURSE REASSESS COMMENT FT1
Gave report to Zan Hutchinson in CDU, pt a&Ox4, VSS, respirations even and unlabored, no distress noted.

## 2022-06-21 NOTE — ED STATDOCS - CLINICAL SUMMARY MEDICAL DECISION MAKING FREE TEXT BOX
46 YOF p/w RUQ pain w/ dialated CBD and gallstones on outpatient ultrasound arriving with RUQ TTP and scleral icterus. Concern for cholecystitis vs. choledocholithiasis vs. pancreatitis vs. appendicitis vs. hepatitis vs. pyelonephritis. Will obtain blood work, ultrasound, CT, urine, fluids, and pain meds. 46 YOF p/w RUQ pain w/ dialated CBD and gallstones on outpatient ultrasound arriving with RUQ TTP and scleral icterus. Concern for choledocholithiasis vs. cholecystitis vs. pancreatitis vs. hepatitis vs. appendicitis vs. pyelonephritis. Will obtain blood work, ultrasound, CT, urine, fluids, and pain meds.

## 2022-06-21 NOTE — H&P ADULT - NSICDXPASTMEDICALHX_GEN_ALL_CORE_FT
PAST MEDICAL HISTORY:  Multinodular goiter (nontoxic)       Rheumatoid arthritis     S/P thyroidectomy      PAST MEDICAL HISTORY:  Multinodular goiter (nontoxic)     Rheumatoid arthritis     S/P thyroidectomy

## 2022-06-21 NOTE — H&P ADULT - NSICDXPASTSURGICALHX_GEN_ALL_CORE_FT
PAST SURGICAL HISTORY:  No significant past surgical history      PAST SURGICAL HISTORY:  H/O thyroidectomy

## 2022-06-21 NOTE — H&P ADULT - HISTORY OF PRESENT ILLNESS
47yo M with PMHx RA on Humira, thyroidectomy presented to ED c/o abdominal pain. In the ED afebrile, no wbc found to have elevated LFTs, tbili 11.1,CSH098, , alk phos 351, US GB showed distended gallbladder with multiple small stones. No evidence of acute cholecystitis. However, there is intra and extra hepatic biliary ductal dilatation raising possibility of choledocholithiasis.    45yo M with PMHx RA on Humira, thyroidectomy presented to ED c/o abdominal pain. Pt reports to intermittent epigastric/RUQ pain the past 1month. Pt states that she first experienced pain over the memorial day weekend, since she been having reoccurring intermittent pain, [facility] and was diagnosed with stone in her gallbladder but pt states she did not have fever so she went home, a week later pain returned again. She saw her PMD who referred her for surgical eval scheduled for next week. Pt states he noted her eyes are turning yellow and pain worsened, she came to ED for eval. reports to decreased appetite, no n/v, cp sob, palpitations, fever or chills. In the ED afebrile, no wbc found to have elevated LFTs, tbili 11.1,MFD872, , alk phos 351, US GB showed distended gallbladder with multiple small stones. No evidence of acute cholecystitis. However, there is intra and extra hepatic biliary ductal dilatation raising possibility of choledocholithiasis.

## 2022-06-21 NOTE — ED STATDOCS - NSICDXPASTMEDICALHX_GEN_ALL_CORE_FT
PAST MEDICAL HISTORY:  Multinodular goiter (nontoxic)       Rheumatoid arthritis     S/P thyroidectomy

## 2022-06-21 NOTE — ED STATDOCS - NS ED ROS FT
General: no fevers  Head: no headaches  Eyes: no vision change  ENT: no nasal discharge/congestion, no sore throat, no ear pain  CV: no chest pain  Resp: no SOB, no cough  GI: no N/V/D, (+) abdominal pain, no blood in stool  : (+) dysuria, no hematuria, no vaginal discharge  MSK: no joint pain, no muscle aches, no neck pain or back pain  Skin: no new rash  Neuro: no focal weakness, no change in sensation General: no fever  Head: no headaches  Eyes: no vision change  ENT: no nasal discharge/congestion  CV: no chest pain  Resp: no SOB, no cough  GI: +intermittent N/V, (+) abdominal pain, no diarrhea  : (+) dysuria, no hematuria, no vaginal discharge  MSK: no joint pain  Skin: no new rash  Neuro: no focal weakness, no change in sensation

## 2022-06-21 NOTE — ED ADULT TRIAGE NOTE - CHIEF COMPLAINT QUOTE
Pt with right sided abdominal pain for the last few weeks. Had an outpatient ultrasound done and has an appt with surgeon for her gallbladder on the 19th but her pain is too bad to wait that long.

## 2022-06-22 DIAGNOSIS — E89.0 POSTPROCEDURAL HYPOTHYROIDISM: Chronic | ICD-10-CM

## 2022-06-22 DIAGNOSIS — R74.8 ABNORMAL LEVELS OF OTHER SERUM ENZYMES: ICD-10-CM

## 2022-06-22 LAB
ALBUMIN SERPL ELPH-MCNC: 3.5 G/DL — SIGNIFICANT CHANGE UP (ref 3.3–5.2)
ALP SERPL-CCNC: 319 U/L — HIGH (ref 40–120)
ALT FLD-CCNC: 208 U/L — HIGH
ANION GAP SERPL CALC-SCNC: 14 MMOL/L — SIGNIFICANT CHANGE UP (ref 5–17)
APTT BLD: 30.9 SEC — SIGNIFICANT CHANGE UP (ref 27.5–35.5)
AST SERPL-CCNC: 115 U/L — HIGH
BILIRUB SERPL-MCNC: 10.8 MG/DL — HIGH (ref 0.4–2)
BUN SERPL-MCNC: 3.8 MG/DL — LOW (ref 8–20)
CALCIUM SERPL-MCNC: 8.3 MG/DL — LOW (ref 8.6–10.2)
CANCER AG125 SERPL-ACNC: 17 U/ML — SIGNIFICANT CHANGE UP
CANCER AG19-9 SERPL-ACNC: 95 U/ML — HIGH
CHLORIDE SERPL-SCNC: 105 MMOL/L — SIGNIFICANT CHANGE UP (ref 98–107)
CO2 SERPL-SCNC: 20 MMOL/L — LOW (ref 22–29)
CREAT SERPL-MCNC: 0.26 MG/DL — LOW (ref 0.5–1.3)
CULTURE RESULTS: SIGNIFICANT CHANGE UP
EGFR: 139 ML/MIN/1.73M2 — SIGNIFICANT CHANGE UP
GLUCOSE SERPL-MCNC: 74 MG/DL — SIGNIFICANT CHANGE UP (ref 70–99)
HCT VFR BLD CALC: 36.5 % — SIGNIFICANT CHANGE UP (ref 34.5–45)
HGB BLD-MCNC: 12.1 G/DL — SIGNIFICANT CHANGE UP (ref 11.5–15.5)
INR BLD: 1.2 RATIO — HIGH (ref 0.88–1.16)
MCHC RBC-ENTMCNC: 30.1 PG — SIGNIFICANT CHANGE UP (ref 27–34)
MCHC RBC-ENTMCNC: 33.2 GM/DL — SIGNIFICANT CHANGE UP (ref 32–36)
MCV RBC AUTO: 90.8 FL — SIGNIFICANT CHANGE UP (ref 80–100)
PLATELET # BLD AUTO: 296 K/UL — SIGNIFICANT CHANGE UP (ref 150–400)
POTASSIUM SERPL-MCNC: 3.3 MMOL/L — LOW (ref 3.5–5.3)
POTASSIUM SERPL-SCNC: 3.3 MMOL/L — LOW (ref 3.5–5.3)
PROT SERPL-MCNC: 7.2 G/DL — SIGNIFICANT CHANGE UP (ref 6.6–8.7)
PROTHROM AB SERPL-ACNC: 13.9 SEC — HIGH (ref 10.5–13.4)
RBC # BLD: 4.02 M/UL — SIGNIFICANT CHANGE UP (ref 3.8–5.2)
RBC # FLD: 15.7 % — HIGH (ref 10.3–14.5)
SODIUM SERPL-SCNC: 139 MMOL/L — SIGNIFICANT CHANGE UP (ref 135–145)
SPECIMEN SOURCE: SIGNIFICANT CHANGE UP
WBC # BLD: 5.63 K/UL — SIGNIFICANT CHANGE UP (ref 3.8–10.5)
WBC # FLD AUTO: 5.63 K/UL — SIGNIFICANT CHANGE UP (ref 3.8–10.5)

## 2022-06-22 PROCEDURE — 99255 IP/OBS CONSLTJ NEW/EST HI 80: CPT

## 2022-06-22 PROCEDURE — 99233 SBSQ HOSP IP/OBS HIGH 50: CPT

## 2022-06-22 RX ORDER — MORPHINE SULFATE 50 MG/1
2 CAPSULE, EXTENDED RELEASE ORAL EVERY 6 HOURS
Refills: 0 | Status: DISCONTINUED | OUTPATIENT
Start: 2022-06-22 | End: 2022-06-23

## 2022-06-22 RX ORDER — MORPHINE SULFATE 50 MG/1
4 CAPSULE, EXTENDED RELEASE ORAL EVERY 4 HOURS
Refills: 0 | Status: DISCONTINUED | OUTPATIENT
Start: 2022-06-22 | End: 2022-06-23

## 2022-06-22 RX ORDER — SODIUM CHLORIDE 9 MG/ML
1000 INJECTION INTRAMUSCULAR; INTRAVENOUS; SUBCUTANEOUS
Refills: 0 | Status: DISCONTINUED | OUTPATIENT
Start: 2022-06-22 | End: 2022-06-23

## 2022-06-22 RX ORDER — ADALIMUMAB 40MG/0.8ML
0 KIT SUBCUTANEOUS
Qty: 0 | Refills: 0 | DISCHARGE

## 2022-06-22 RX ORDER — LEVOTHYROXINE SODIUM 125 MCG
88 TABLET ORAL DAILY
Refills: 0 | Status: DISCONTINUED | OUTPATIENT
Start: 2022-06-22 | End: 2022-06-23

## 2022-06-22 RX ADMIN — MORPHINE SULFATE 4 MILLIGRAM(S): 50 CAPSULE, EXTENDED RELEASE ORAL at 08:08

## 2022-06-22 RX ADMIN — Medication 3 MILLIGRAM(S): at 21:50

## 2022-06-22 RX ADMIN — MORPHINE SULFATE 4 MILLIGRAM(S): 50 CAPSULE, EXTENDED RELEASE ORAL at 22:31

## 2022-06-22 RX ADMIN — Medication 88 MICROGRAM(S): at 06:06

## 2022-06-22 RX ADMIN — MORPHINE SULFATE 4 MILLIGRAM(S): 50 CAPSULE, EXTENDED RELEASE ORAL at 21:52

## 2022-06-22 RX ADMIN — MORPHINE SULFATE 2 MILLIGRAM(S): 50 CAPSULE, EXTENDED RELEASE ORAL at 06:07

## 2022-06-22 RX ADMIN — MORPHINE SULFATE 4 MILLIGRAM(S): 50 CAPSULE, EXTENDED RELEASE ORAL at 09:00

## 2022-06-22 NOTE — CONSULT NOTE ADULT - PROBLEM SELECTOR RECOMMENDATION 9
Mildly dilated CBD, elevated LFT   CT shows some fullness at the head of the pancreas.  Will get CA 19-9 and MRCP with IV contrast to evaluate for pancreatic head mass and CBD stones   Will discuss EUS/ERCP with Dr León in am ( may not be able to do until friday )  may start clear liquids  trend LFT  surgery consult

## 2022-06-22 NOTE — CONSULT NOTE ADULT - SUBJECTIVE AND OBJECTIVE BOX
Patient is a 46y old  Female who presents with a chief complaint of     HPI:  47yo M with PMHx RA on Humira, thyroidectomy presented to ED c/o abdominal pain. Thought it was "food poisoning".  She had no fevers no chills.  Symptoms subsided after 3 days.  Then starting Nanda 10 she began with periumbilical pain and lower abdominal cramping pain after eating a bagel and coffee.  The pain now has been intermittent for 10 days.  She began with some chest pain and heartburn which eventually turned epigastric and then into periumbilical pain.  No fevers.  She has alternating diarrhea constipation which is light green in color.  She did not notice jaundice.  However her bilirubin is 10 in the emergency room with moderate elevation of transaminases.  Her CAT scan shows some fullness at the head of the pancreas as well as a CBD of 8 mm.  Ultrasound showed some thickening gallbladder with gallstones.  Father has history of liver disease however she does not specifically know the etiology.  No family history of gallbladder or pancreatic disease.        REVIEW OF SYSTEMS:  Constitutional: No fever, weight loss or fatigue  ENMT:  No difficulty hearing, tinnitus, vertigo; No sinus or throat pain  Respiratory: No cough, wheezing, chills or hemoptysis  Cardiovascular: No chest pain, palpitations, dizziness or leg swelling  Gastrointestinal: + epigastric pain. No nausea, vomiting or hematemesis; No diarrhea or constipation. No melena or hematochezia.  Skin: No itching, burning, rashes or lesions   Musculoskeletal: No joint pain or swelling; No muscle, back or extremity pain    PAST MEDICAL & SURGICAL HISTORY:  Multinodular goiter (nontoxic)      Rheumatoid arthritis      S/P thyroidectomy      H/O thyroidectomy          FAMILY HISTORY:  FH: hypothyroidism        SOCIAL HISTORY:  Smoking Status: [ ] Current, [ ] Former, [ ] Never  Pack Years:  [  ] EtOH-no  [  ] IVDA    MEDICATIONS:  MEDICATIONS  (STANDING):  levothyroxine 88 MICROGram(s) Oral daily  sodium chloride 0.9%. 1000 milliLiter(s) (80 mL/Hr) IV Continuous <Continuous>    MEDICATIONS  (PRN):  melatonin 3 milliGRAM(s) Oral at bedtime PRN Insomnia  morphine  - Injectable 2 milliGRAM(s) IV Push every 6 hours PRN Moderate Pain (4 - 6)  morphine  - Injectable 4 milliGRAM(s) IV Push every 4 hours PRN Severe Pain (7 - 10)  ondansetron Injectable 4 milliGRAM(s) IV Push every 8 hours PRN Nausea and/or Vomiting      Allergies    No Known Allergies    Intolerances        Vital Signs Last 24 Hrs  T(C): 36.9 (22 Jun 2022 04:34), Max: 37 (21 Jun 2022 15:47)  T(F): 98.5 (22 Jun 2022 04:34), Max: 98.6 (21 Jun 2022 15:47)  HR: 92 (22 Jun 2022 04:34) (77 - 92)  BP: 149/95 (22 Jun 2022 04:34) (147/93 - 150/96)  BP(mean): --  RR: 18 (22 Jun 2022 04:34) (16 - 18)  SpO2: 97% (22 Jun 2022 04:34) (97% - 98%)        PHYSICAL EXAM:    General:  in no acute distress  HEENT: MMM, conjunctiva and scleral icterus   H-RRR  L-CTA  Gastrointestinal: Soft, mild RUQ -tendernes on palpation.   non-distended; Normal bowel sounds; No rebound or guarding  Extremities: Normal range of motion, No clubbing, cyanosis or edema  Neurological: Alert and oriented x3  Skin: Warm and dry. No obvious rash      LABS:                        12.1   5.63  )-----------( 296      ( 22 Jun 2022 03:18 )             36.5     22 Jun 2022 03:18    139    |  105    |  3.8    ----------------------------<  74     3.3     |  20.0   |  0.26     Ca    8.3        22 Jun 2022 03:18    TPro  7.2    /  Alb  3.5    /  TBili  10.8   /  DBili  x      /  AST  115    /  ALT  208    /  AlkPhos  319    / Amylase x      /Lipase x      22 Jun 2022 03:18              RADIOLOGY & ADDITIONAL STUDIES:     < from: CT Abdomen and Pelvis w/ IV Cont (06.21.22 @ 20:08) >  IMPRESSION:  Biliary dilatation traced to the level of the superior pancreatic head   which appears enlarged suggesting inflammation or neoplasm.  Hydropic gallbladder.  Recommend further evaluation with MRI/MRCP      < end of copied text >

## 2022-06-23 VITALS
SYSTOLIC BLOOD PRESSURE: 154 MMHG | RESPIRATION RATE: 18 BRPM | DIASTOLIC BLOOD PRESSURE: 91 MMHG | HEART RATE: 84 BPM | TEMPERATURE: 98 F | OXYGEN SATURATION: 96 %

## 2022-06-23 LAB
ALBUMIN SERPL ELPH-MCNC: 3.4 G/DL — SIGNIFICANT CHANGE UP (ref 3.3–5.2)
ALP SERPL-CCNC: 322 U/L — HIGH (ref 40–120)
ALT FLD-CCNC: 183 U/L — HIGH
ANION GAP SERPL CALC-SCNC: 14 MMOL/L — SIGNIFICANT CHANGE UP (ref 5–17)
AST SERPL-CCNC: 117 U/L — HIGH
BILIRUB SERPL-MCNC: 10.7 MG/DL — HIGH (ref 0.4–2)
BUN SERPL-MCNC: 3.6 MG/DL — LOW (ref 8–20)
CALCIUM SERPL-MCNC: 8.3 MG/DL — LOW (ref 8.6–10.2)
CHLORIDE SERPL-SCNC: 103 MMOL/L — SIGNIFICANT CHANGE UP (ref 98–107)
CO2 SERPL-SCNC: 22 MMOL/L — SIGNIFICANT CHANGE UP (ref 22–29)
CREAT SERPL-MCNC: 0.28 MG/DL — LOW (ref 0.5–1.3)
EGFR: 136 ML/MIN/1.73M2 — SIGNIFICANT CHANGE UP
GLUCOSE SERPL-MCNC: 87 MG/DL — SIGNIFICANT CHANGE UP (ref 70–99)
HCT VFR BLD CALC: 35.2 % — SIGNIFICANT CHANGE UP (ref 34.5–45)
HGB BLD-MCNC: 11.8 G/DL — SIGNIFICANT CHANGE UP (ref 11.5–15.5)
MCHC RBC-ENTMCNC: 30.3 PG — SIGNIFICANT CHANGE UP (ref 27–34)
MCHC RBC-ENTMCNC: 33.5 GM/DL — SIGNIFICANT CHANGE UP (ref 32–36)
MCV RBC AUTO: 90.3 FL — SIGNIFICANT CHANGE UP (ref 80–100)
PLATELET # BLD AUTO: 298 K/UL — SIGNIFICANT CHANGE UP (ref 150–400)
POTASSIUM SERPL-MCNC: 3.4 MMOL/L — LOW (ref 3.5–5.3)
POTASSIUM SERPL-SCNC: 3.4 MMOL/L — LOW (ref 3.5–5.3)
PROT SERPL-MCNC: 6.9 G/DL — SIGNIFICANT CHANGE UP (ref 6.6–8.7)
RBC # BLD: 3.9 M/UL — SIGNIFICANT CHANGE UP (ref 3.8–5.2)
RBC # FLD: 15.7 % — HIGH (ref 10.3–14.5)
SODIUM SERPL-SCNC: 139 MMOL/L — SIGNIFICANT CHANGE UP (ref 135–145)
WBC # BLD: 5.35 K/UL — SIGNIFICANT CHANGE UP (ref 3.8–10.5)
WBC # FLD AUTO: 5.35 K/UL — SIGNIFICANT CHANGE UP (ref 3.8–10.5)

## 2022-06-23 PROCEDURE — 96365 THER/PROPH/DIAG IV INF INIT: CPT

## 2022-06-23 PROCEDURE — 99285 EMERGENCY DEPT VISIT HI MDM: CPT

## 2022-06-23 PROCEDURE — 83605 ASSAY OF LACTIC ACID: CPT

## 2022-06-23 PROCEDURE — 82248 BILIRUBIN DIRECT: CPT

## 2022-06-23 PROCEDURE — 82435 ASSAY OF BLOOD CHLORIDE: CPT

## 2022-06-23 PROCEDURE — 84132 ASSAY OF SERUM POTASSIUM: CPT

## 2022-06-23 PROCEDURE — 99233 SBSQ HOSP IP/OBS HIGH 50: CPT

## 2022-06-23 PROCEDURE — 85027 COMPLETE CBC AUTOMATED: CPT

## 2022-06-23 PROCEDURE — 82803 BLOOD GASES ANY COMBINATION: CPT

## 2022-06-23 PROCEDURE — 82947 ASSAY GLUCOSE BLOOD QUANT: CPT

## 2022-06-23 PROCEDURE — 76705 ECHO EXAM OF ABDOMEN: CPT

## 2022-06-23 PROCEDURE — 93005 ELECTROCARDIOGRAM TRACING: CPT

## 2022-06-23 PROCEDURE — 74177 CT ABD & PELVIS W/CONTRAST: CPT | Mod: MA

## 2022-06-23 PROCEDURE — 87086 URINE CULTURE/COLONY COUNT: CPT

## 2022-06-23 PROCEDURE — 85610 PROTHROMBIN TIME: CPT

## 2022-06-23 PROCEDURE — 84702 CHORIONIC GONADOTROPIN TEST: CPT

## 2022-06-23 PROCEDURE — U0003: CPT

## 2022-06-23 PROCEDURE — 85018 HEMOGLOBIN: CPT

## 2022-06-23 PROCEDURE — 86304 IMMUNOASSAY TUMOR CA 125: CPT

## 2022-06-23 PROCEDURE — 80053 COMPREHEN METABOLIC PANEL: CPT

## 2022-06-23 PROCEDURE — 85025 COMPLETE CBC W/AUTO DIFF WBC: CPT

## 2022-06-23 PROCEDURE — 81001 URINALYSIS AUTO W/SCOPE: CPT

## 2022-06-23 PROCEDURE — 85730 THROMBOPLASTIN TIME PARTIAL: CPT

## 2022-06-23 PROCEDURE — 83690 ASSAY OF LIPASE: CPT

## 2022-06-23 PROCEDURE — U0005: CPT

## 2022-06-23 PROCEDURE — 71045 X-RAY EXAM CHEST 1 VIEW: CPT

## 2022-06-23 PROCEDURE — 86703 HIV-1/HIV-2 1 RESULT ANTBDY: CPT

## 2022-06-23 PROCEDURE — 85014 HEMATOCRIT: CPT

## 2022-06-23 PROCEDURE — 84295 ASSAY OF SERUM SODIUM: CPT

## 2022-06-23 PROCEDURE — 86301 IMMUNOASSAY TUMOR CA 19-9: CPT

## 2022-06-23 PROCEDURE — 82330 ASSAY OF CALCIUM: CPT

## 2022-06-23 PROCEDURE — 36415 COLL VENOUS BLD VENIPUNCTURE: CPT

## 2022-06-23 RX ORDER — INDOMETHACIN 50 MG
100 CAPSULE ORAL ONCE
Refills: 0 | Status: CANCELLED | OUTPATIENT
Start: 2022-06-24 | End: 2022-06-23

## 2022-06-23 RX ORDER — PIPERACILLIN AND TAZOBACTAM 4; .5 G/20ML; G/20ML
3.38 INJECTION, POWDER, LYOPHILIZED, FOR SOLUTION INTRAVENOUS ONCE
Refills: 0 | Status: CANCELLED | OUTPATIENT
Start: 2022-06-24 | End: 2022-06-23

## 2022-06-23 RX ADMIN — Medication 88 MICROGRAM(S): at 06:00

## 2022-06-23 NOTE — PROGRESS NOTE ADULT - TIME BILLING
I reviewed the plan with pt and  at bedside.  I explained risks and benefits of ERCP/EUS> Answered questions.   reviewd notes and labs

## 2022-06-23 NOTE — CHART NOTE - NSCHARTNOTEFT_GEN_A_CORE
The patient is clinically not intoxicated, free from distracting pain, appears to have intact insight, judgment and reason and in my medical opinion has the capacity to make decisions. The patient is also not under any duress to leave the hospital. I have discussed the need for continued evaluation to determine if their symptoms are caused by a condition that present risk of death or morbidity. Risks including but not limited to death, permanent disability, prolonged hospitalization, prolonged illness, were discussed. I tried offering alternative options in hopes that the patient might be amenable to partial evaluation and treatment which would be medically beneficial to the patient, though the patient declined my options and insisted on leaving. Because I have been unable to convince the patient to stay, I answered all of their questions about their condition and asked them to return to the ED as soon as possible to complete their evaluation, especially if their symptoms worsen or do not improve. I emphasized that leaving against medical advice does not preclude returning here for further evaluation. I asked the patient to return if they change their mind about the further evaluation and treatment. I strongly encouraged the patient to return to the ER or any Emergency Department at any time, particularly with worsening symptoms.

## 2022-06-23 NOTE — PROGRESS NOTE ADULT - PROBLEM SELECTOR PLAN 1
Patient with mildly dilated CBD, pancreatic head fullness. ELevated CA 19-9   LFT remain elevated    trend LFT   Awaiting MRI/MRCP. I spoke to MRI- likley will not be done today, I spoke to pt. If MRCp cannot be done today, will do EUS /ERCP tomorrow. Possible FNA ( Dr León not available today )   clear liquid diet. NPO after midnight. HOld lovenox

## 2022-06-23 NOTE — PROGRESS NOTE ADULT - SUBJECTIVE AND OBJECTIVE BOX
Patient is a 46y old  Female who presents with a chief complaint of     HPI:  45yo M with PMHx RA on Humira, thyroidectomy presented to ED c/o abdominal pain. Pt reports to intermittent epigastric/RUQ pain the past 1month. This morning pain is better.  She just received pain medication from the nurse early this morning.  No nausea no vomiting.  Tolerating solid diet.  No fevers.  LFTs remain elevated (about the same).  Patient was noted to have elevated CA 19–9.  Still has not had the MRI CP      REVIEW OF SYSTEMS:  Constitutional: No fever, weight loss or fatigue  ENMT:  No difficulty hearing, tinnitus, vertigo; No sinus or throat pain  Respiratory: No cough, wheezing, chills or hemoptysis  Cardiovascular: No chest pain, palpitations, dizziness or leg swelling  Gastrointestinal: No abdominal or epigastric pain. No nausea, vomiting or hematemesis; No diarrhea or constipation. No melena or hematochezia.  Skin: No itching, burning, rashes or lesions   Musculoskeletal: No joint pain or swelling; No muscle, back or extremity pain    PAST MEDICAL & SURGICAL HISTORY:  Multinodular goiter (nontoxic)      Rheumatoid arthritis      S/P thyroidectomy      H/O thyroidectomy          FAMILY HISTORY:  FH: hypothyroidism        SOCIAL HISTORY:  Smoking Status: [ ] Current, [ ] Former, [ ] Never  Pack Years:  [  ] EtOH-no  [  ] IVDA    MEDICATIONS:  MEDICATIONS  (STANDING):  levothyroxine 88 MICROGram(s) Oral daily  sodium chloride 0.9%. 1000 milliLiter(s) (80 mL/Hr) IV Continuous <Continuous>    MEDICATIONS  (PRN):  melatonin 3 milliGRAM(s) Oral at bedtime PRN Insomnia  morphine  - Injectable 2 milliGRAM(s) IV Push every 6 hours PRN Moderate Pain (4 - 6)  morphine  - Injectable 4 milliGRAM(s) IV Push every 4 hours PRN Severe Pain (7 - 10)  ondansetron Injectable 4 milliGRAM(s) IV Push every 8 hours PRN Nausea and/or Vomiting      Allergies    No Known Allergies    Intolerances        Vital Signs Last 24 Hrs  T(C): 36.8 (23 Jun 2022 09:00), Max: 37.1 (22 Jun 2022 16:11)  T(F): 98.3 (23 Jun 2022 09:00), Max: 98.7 (22 Jun 2022 16:11)  HR: 84 (23 Jun 2022 09:00) (84 - 96)  BP: 154/91 (23 Jun 2022 09:00) (145/87 - 161/70)  BP(mean): --  RR: 18 (23 Jun 2022 09:00) (18 - 20)  SpO2: 96% (23 Jun 2022 09:00) (93% - 97%)        PHYSICAL EXAM:    General: ; in no acute distress  HEENT: MMM, conjunctiva and sclera clear  H-RRR  L-CTA  Gastrointestinal: Soft, milf right upper quadrant tenderness non-distended; Normal bowel sounds; No rebound or guarding  Extremities: Normal range of motion, No clubbing, cyanosis or edema  Neurological: Alert and oriented x3  Skin: Warm and dry. No obvious rash      LABS:                        11.8   5.35  )-----------( 298      ( 23 Jun 2022 02:50 )             35.2     23 Jun 2022 02:50    139    |  103    |  3.6    ----------------------------<  87     3.4     |  22.0   |  0.28     Ca    8.3        23 Jun 2022 02:50    TPro  6.9    /  Alb  3.4    /  TBili  10.7   /  DBili  x      /  AST  117    /  ALT  183    /  AlkPhos  322    / Amylase x      /Lipase x      23 Jun 2022 02:50              RADIOLOGY & ADDITIONAL STUDIES:     < from: CT Abdomen and Pelvis w/ IV Cont (06.21.22 @ 20:08) >  IMPRESSION:  Biliary dilatation traced to the level of the superior pancreatic head   which appears enlarged suggesting inflammation or neoplasm.  Hydropic gallbladder.  Recommend further evaluation with MRI/MRCP      < end of copied text >  
[facility] Division of Hospital Medicine  Alfredo Wolf MD    Chief Complaint:  Patient is a 46y old  Female who presents with a chief complaint of     SUBJECTIVE / OVERNIGHT EVENTS:  Patient seen and examined at bedside. No acute events reported overnight. Still w/ abd pain. No new complaints.     Patient denies chest pain, SOB, N/V, fever, chills, dysuria or any other complaints. All remainder ROS negative.     MEDICATIONS  (STANDING):  levothyroxine 88 MICROGram(s) Oral daily  sodium chloride 0.9%. 1000 milliLiter(s) (80 mL/Hr) IV Continuous <Continuous>    MEDICATIONS  (PRN):  melatonin 3 milliGRAM(s) Oral at bedtime PRN Insomnia  morphine  - Injectable 2 milliGRAM(s) IV Push every 6 hours PRN Moderate Pain (4 - 6)  morphine  - Injectable 4 milliGRAM(s) IV Push every 4 hours PRN Severe Pain (7 - 10)  ondansetron Injectable 4 milliGRAM(s) IV Push every 8 hours PRN Nausea and/or Vomiting        I&O's Summary      PHYSICAL EXAM:  Vital Signs Last 24 Hrs  T(C): 36.7 (2022 09:36), Max: 37 (2022 15:47)  T(F): 98.1 (2022 09:36), Max: 98.6 (2022 15:47)  HR: 93 (2022 09:36) (77 - 93)  BP: 161/70 (2022 09:36) (147/93 - 161/70)  BP(mean): --  RR: 18 (2022 09:36) (16 - 18)  SpO2: 93% (2022 09:36) (93% - 98%)        CONSTITUTIONAL: NAD  HEENT: NC/AT, +scleral icterus  RESPIRATORY: CTA bilaterally, normal effort  CARDIOVASCULAR: RRR, S1/S2+, no m/g/r  ABDOMEN: TTP, positive BS  MUSCULOSKELETAL: No edema, cyanosis or deformities.  PSYCH: Calm, affect appropriate.  NEUROLOGY: Awake, alert, no focal neurological deficits.   SKIN: +Jaundice  VASC: Distal pulses palpable    LABS:                        12.1   5.63  )-----------( 296      ( 2022 03:18 )             36.5     06-22    139  |  105  |  3.8<L>  ----------------------------<  74  3.3<L>   |  20.0<L>  |  0.26<L>    Ca    8.3<L>      2022 03:18    TPro  7.2  /  Alb  3.5  /  TBili  10.8<H>  /  DBili  x   /  AST  115<H>  /  ALT  208<H>  /  AlkPhos  319<H>      PT/INR - ( 2022 08:17 )   PT: 13.9 sec;   INR: 1.20 ratio         PTT - ( 2022 08:17 )  PTT:30.9 sec      Urinalysis Basic - ( 2022 17:52 )    Color: Yellow / Appearance: Clear / S.010 / pH: x  Gluc: x / Ketone: Moderate  / Bili: Large / Urobili: 4 mg/dL   Blood: x / Protein: Negative / Nitrite: Negative   Leuk Esterase: Trace / RBC: 0-2 /HPF / WBC 3-5 /HPF   Sq Epi: x / Non Sq Epi: Few / Bacteria: Few        CAPILLARY BLOOD GLUCOSE            RADIOLOGY & ADDITIONAL TESTS:  Results Reviewed:   Imaging Personally Reviewed:  Electrocardiogram Personally Reviewed:                                          
[facility] Division of Hospital Medicine  Alfredo Wolf MD    Chief Complaint:  Patient is a 46y old  Female who presents with a chief complaint of elevated liver function test, RUQ pain (2022 09:32)      SUBJECTIVE / OVERNIGHT EVENTS:  Patient seen and examined at bedside. No acute events reported overnight. No new complaints. Still with abd pain. Wants to leave AMA.    Patient denies chest pain, SOB, abd pain, N/V, fever, chills, dysuria or any other complaints. All remainder ROS negative.     MEDICATIONS  (STANDING):  levothyroxine 88 MICROGram(s) Oral daily  sodium chloride 0.9%. 1000 milliLiter(s) (80 mL/Hr) IV Continuous <Continuous>    MEDICATIONS  (PRN):  melatonin 3 milliGRAM(s) Oral at bedtime PRN Insomnia  morphine  - Injectable 2 milliGRAM(s) IV Push every 6 hours PRN Moderate Pain (4 - 6)  morphine  - Injectable 4 milliGRAM(s) IV Push every 4 hours PRN Severe Pain (7 - 10)  ondansetron Injectable 4 milliGRAM(s) IV Push every 8 hours PRN Nausea and/or Vomiting        I&O's Summary      PHYSICAL EXAM:  Vital Signs Last 24 Hrs  T(C): 36.8 (2022 09:00), Max: 37.1 (2022 16:11)  T(F): 98.3 (2022 09:00), Max: 98.7 (2022 16:11)  HR: 84 (2022 09:00) (84 - 96)  BP: 154/91 (2022 09:00) (145/87 - 154/91)  BP(mean): --  RR: 18 (2022 09:00) (18 - 20)  SpO2: 96% (2022 09:00) (93% - 97%)        CONSTITUTIONAL: NAD  HEENT: NC/AT, +scleral icterus  RESPIRATORY: CTA bilaterally, normal effort  CARDIOVASCULAR: RRR, S1/S2+, no m/g/r  ABDOMEN: +TTP, BS+, non-distended  MUSCULOSKELETAL: No edema, cyanosis or deformities.  PSYCH: Calm, affect appropriate.  NEUROLOGY: Awake, alert, no focal neurological deficits.   SKIN: Jaundice  VASC: Distal pulses palpable    LABS:                        11.8   5.35  )-----------( 298      ( 2022 02:50 )             35.2     -    139  |  103  |  3.6<L>  ----------------------------<  87  3.4<L>   |  22.0  |  0.28<L>    Ca    8.3<L>      2022 02:50    TPro  6.9  /  Alb  3.4  /  TBili  10.7<H>  /  DBili  x   /  AST  117<H>  /  ALT  183<H>  /  AlkPhos  322<H>      PT/INR - ( 2022 08:17 )   PT: 13.9 sec;   INR: 1.20 ratio         PTT - ( 2022 08:17 )  PTT:30.9 sec      Urinalysis Basic - ( 2022 17:52 )    Color: Yellow / Appearance: Clear / S.010 / pH: x  Gluc: x / Ketone: Moderate  / Bili: Large / Urobili: 4 mg/dL   Blood: x / Protein: Negative / Nitrite: Negative   Leuk Esterase: Trace / RBC: 0-2 /HPF / WBC 3-5 /HPF   Sq Epi: x / Non Sq Epi: Few / Bacteria: Few        Culture - Urine (collected 2022 00:25)  Source: Clean Catch Clean Catch (Midstream)  Final Report (2022 22:18):    <10,000 CFU/mL Normal Urogenital Isabelle      CAPILLARY BLOOD GLUCOSE            RADIOLOGY & ADDITIONAL TESTS:  Results Reviewed:   Imaging Personally Reviewed:  Electrocardiogram Personally Reviewed:

## 2024-08-14 ENCOUNTER — OFFICE (OUTPATIENT)
Dept: URBAN - METROPOLITAN AREA CLINIC 112 | Facility: CLINIC | Age: 49
Setting detail: OPHTHALMOLOGY
End: 2024-08-14
Payer: COMMERCIAL

## 2024-08-14 DIAGNOSIS — H52.4: ICD-10-CM

## 2024-08-14 DIAGNOSIS — H53.10: ICD-10-CM

## 2024-08-14 PROCEDURE — 92014 COMPRE OPH EXAM EST PT 1/>: CPT | Performed by: OPHTHALMOLOGY

## 2024-08-14 PROCEDURE — 92015 DETERMINE REFRACTIVE STATE: CPT | Performed by: OPHTHALMOLOGY

## 2024-08-14 ASSESSMENT — CONFRONTATIONAL VISUAL FIELD TEST (CVF)
OD_FINDINGS: FULL
OS_FINDINGS: FULL

## 2025-01-10 NOTE — H&P PST ADULT - VISION (WITH CORRECTIVE LENSES IF THE PATIENT USUALLY WEARS THEM):
COPD (chronic obstructive pulmonary disease) Normal vision: sees adequately in most situations; can see medication labels, newsprint